# Patient Record
Sex: MALE | Race: WHITE | URBAN - METROPOLITAN AREA
[De-identification: names, ages, dates, MRNs, and addresses within clinical notes are randomized per-mention and may not be internally consistent; named-entity substitution may affect disease eponyms.]

---

## 2018-11-09 ENCOUNTER — HOSPITAL ENCOUNTER (OUTPATIENT)
Dept: NUTRITION | Facility: HOSPITAL | Age: 38
End: 2018-11-10
Attending: INTERNAL MEDICINE | Admitting: INTERNAL MEDICINE

## 2018-11-09 LAB
AMPHET UR QL SCN: NORMAL
APPEARANCE, UA: CLEAR
BACTERIA SPEC CULT: NORMAL /HPF
BARBITURATE SCN PRESENT UR: NORMAL
BENZODIAZ UR QL SCN: NORMAL
BILIRUB UR QL STRIP: NEGATIVE
CANNABINOIDS UR QL SCN: NORMAL
COCAINE UR QL SCN: NORMAL
COLOR UR: YELLOW
GLUCOSE (UA): NEGATIVE
HGB UR QL STRIP: NEGATIVE
KETONES UR QL STRIP: NEGATIVE
LEUKOCYTE ESTERASE UR QL STRIP: NEGATIVE
MAGNESIUM SERPL-MCNC: 2.3 MG/DL (ref 1.8–2.4)
NITRITE UR QL STRIP: NEGATIVE
OPIATES UR QL SCN: NORMAL
PCP UR QL: NORMAL
PH UR STRIP.AUTO: 5 [PH] (ref 5–7.5)
PH UR STRIP: 5 [PH] (ref 5–9)
PROT UR QL STRIP: NEGATIVE
RBC #/AREA URNS HPF: NORMAL /[HPF]
SP GR FLD REFRACTOMETRY: 1.02 (ref 1–1.03)
SP GR UR STRIP: 1.02 (ref 1–1.03)
SQUAMOUS EPITHELIAL, UA: NORMAL
T3FREE SERPL-MCNC: 2.77 PG/ML (ref 2.18–3.98)
T4 FREE SERPL-MCNC: 1.12 NG/DL (ref 0.76–1.46)
TROPONIN I SERPL-MCNC: <0.02 NG/ML (ref 0.02–0.49)
UROBILINOGEN UR STRIP-ACNC: 0.2
WBC #/AREA URNS HPF: NORMAL /HPF

## 2022-04-30 NOTE — H&P
Patient:   Parminder Hansen             MRN: [id number]            FIN: [id number]               Age:   38 years     Sex:  Male     :  1980   Associated Diagnoses:   None   Author:   David Clark NP      see progress from Dr feliz as H/P

## 2022-04-30 NOTE — ED PROVIDER NOTES
"   Patient:   Parminder Hansen             MRN: [id number]            FIN: [id number]               Age:   38 years     Sex:  Male     :  1980   Associated Diagnoses:   Tobacco use; Atrial fibrillation with rapid ventricular response   Author:   Jose Raul EPPERSON, Rc LOCKHART      Basic Information   Time seen: Date & time 2018 10:35:00.   History source: Patient, EMS, physician.   Arrival mode: Ambulance-BLS.   History limitation: None.   Additional information: Patient's physician(s): transfer from [facility] A fib and [facility], Chief Complaint from Nursing Triage Note : Chief Complaint   2018 4:51 CST       Chief Complaint           Patient states, he was awaken because his heart was fluttering pta.  .      History of Present Illness   The patient presents with "heart racing", palpitations, Patient was transferred from the [facility].  Apparently woke up at 4:00 palpitations or irregular heart rate.  Was seen at the [facility].  Was treated with Cardizem ×2 doses IV as well as oral same for metoprolol with no change in rhythm.  No slowing of the heart rate consultation was obtained with cardiology.  Patient was started on amiodarone drip which I was told did bring his heart rate down there.  He was given Lovenox is transferred here for continuation and higher level of care and   Patient woke up with the palpitations.  No associated pain noted in the chest no nausea no vomiting no headache no syncope.  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  He does not take any form of amphetamines in any manner.  He does not use street drugs.  He did not take any cough or cold medications over-the-counter.  Normal amount of coffee.  Normal cigarette use nothing new or different  No chest pain associated with this then or now..  The onset was 6  hours ago.  The course/duration of symptoms is constant.  Character of symptoms " irregular fast.  The degree at onset was severe.  The degree at present is severe.  The exacerbating factor is caffeine use but not exertion, emotional stress, coughing, decongestant use or drug use.  The relieving factor is none.  Risk factors consist of smoking, not hypertension, not diabetes mellitus, not coronary artery disease, not atrial fibrillation, not family history, not hyperlipidemia, not drug abuse, not pacemaker and no automatic implantable cardioverter-defibrillator.  Prior episodes: none.  Therapy today: oxygen, emergency medical services and Seen and treated in the outside emergency department failed calcium channel blocker failed beta blocker currently on amiodarone with rate down 118 to 130 has received Lovenox.  Associated symptoms: denies chest pain, denies dizziness, denies near syncope, denies syncope, denies dyspnea, denies shortness of breath, denies nausea, denies vomiting and denies headache.        Review of Systems   Constitutional symptoms:  Negative except as documented in HPI.   Skin symptoms:  Negative except as documented in HPI.   Eye symptoms:  Negative except as documented in HPI.   ENMT symptoms:  Negative except as documented in HPI.   Respiratory symptoms:  Negative except as documented in HPI.   Cardiovascular symptoms:  Palpitations, tachycardia.    Gastrointestinal symptoms:  Negative except as documented in HPI.   Genitourinary symptoms:  Negative except as documented in HPI.   Musculoskeletal symptoms:  Negative except as documented in HPI.   Neurologic symptoms:  Negative except as documented in HPI.   Psychiatric symptoms:  Negative except as documented in HPI.   Endocrine symptoms:  Negative except as documented in HPI.   Hematologic/Lymphatic symptoms:  Negative except as documented in HPI.   Allergy/immunologic symptoms:  Negative except as documented in HPI.             Additional review of systems information: All other systems reviewed and otherwise negative.       Health Status   Allergies:    Allergic Reactions (Selected)  Severity Not Documented  Codeine- No reactions were documented.  HYDROcodone- No reactions were documented.,    Allergies (2) Active Reaction  codeine None Documented  HYDROcodone None Documented.   Medications:  (Selected)   Inpatient Medications  Ordered  Cardizem  mg/24 hours oral CAPsule, extended release: 120 mg, form: Cap-CD, Oral, Daily, first dose 18 5:51:00 CST, STAT  Cordarone 150mg/[id number] Bolus  m mL, 100 mL, IV, 10 mL/min, order duration: 1 dose(s), start date 18 9:01:00 CST, stop date 11/10/18 9:00:00 CST, Bolus Dose.  Admin. Over 10 min.  Normal Saline (0.9% NS) IV 1,000 mL: 1,000 mL, 1,000 mL, IV, 500 mL/hr, start date 18 6:57:00 CST  amiodarone 360 mg/D5W 200 mL PREMIX 360 mg +: 200 mL, 250 mL, IV, 33.3 mL/hr, start date 18 9:01:00 CST  Prescriptions  Prescribed  meloxicam 15 mg oral tablet: 15 mg = 1 tab(s), Oral, Daily, # 30 tab(s), 0 Refill(s)  naproxen 500 mg oral tablet: 500 mg = 1 tab(s), Oral, BID, PRN for pain, with food, # 20 tab(s), 0 Refill(s).   Immunizations: Tetanus up to date, no flu no pneumonia.      Past Medical/ Family/ Social History   Medical history: Patient denies any chronic medical problems denies any daily medication use.   Surgical history: Orthopedic procedures on bilateral knees.  Slipped femoral head  right side status post pinning right hip.  And he's had an appendectomy.  Left knee patella surgery.   Family history: Father  suicide mother alive with thyroid issues.   Social history: Alcohol use: Denies, Tobacco use: Regularly, Drug use: Denies, Occupation: Employed, As well as a student, Family/social situation: , intact family.   Problem list:    Active Problems (2)  Obesity   Tobacco user .      Physical Examination               Vital Signs   Vital Signs   2018 10:32 CST      Temperature Oral          37.1 DegC                              Temperature Oral (calculated)             98.78 DegF                             Peripheral Pulse Rate     115 bpm  HI                             Respiratory Rate          16 br/min                             SpO2                      99 %                             Oxygen Therapy            Room air                             Systolic Blood Pressure   110 mmHg                             Diastolic Blood Pressure  76 mmHg  .      No qualifying data available.   Oxygen saturation.   General:  Alert, no acute distress, Large burly male awake oriented not toxic, not anxious, not ill-appearing.    Skin:  Warm, dry, no rash, normal for ethnicity.    Head:  Normocephalic, atraumatic.    Neck:  Supple, trachea midline, no tenderness, no JVD, no carotid bruit.    Eye:  Pupils are equal, round and reactive to light, extraocular movements are intact.    Ears, nose, mouth and throat:  Tympanic membranes clear, oral mucosa moist, no pharyngeal erythema or exudate.    Cardiovascular:  Irregularly irregular rhythm, Tachycardia, Do not appreciate murmur gallop or rub irregular irregular tachycardic 115 to 130 range currently on monitor.    Respiratory:  Lungs are clear to auscultation, respirations are non-labored, breath sounds are equal.    Chest wall:  No tenderness, No deformity.    Back:  Nontender, Normal range of motion, Normal alignment, no step-offs.    Musculoskeletal:  Normal ROM, normal strength, no tenderness, no swelling, no deformity.    Gastrointestinal:  Soft, Nontender, Non distended, Normal bowel sounds, No organomegaly.    Genitourinary:  no CVA tenderness.   Neurological:  Alert and oriented to person, place, time, and situation, No focal neurological deficit observed, CN II-XII intact, normal sensory observed, normal motor observed, normal speech observed, normal coordination observed.    Lymphatics:  No lymphadenopathy.   Psychiatric:  Cooperative, appropriate mood & affect, normal judgment,  non-suicidal.       Medical Decision Making   Differential Diagnosis::  Tachycardia, atrial fibrillation, atrial flutter.    Documents reviewed:  Emergency department nurses' notes.   Electrocardiogram:  Time 11/9/2018 10:44:00, rate 126, EP Interp, Atrial fibrillation with rapid ventricular response.    Results review:  Lab results : [facility]   11/9/2018 5:25 CST       Sodium Lvl                136 mmol/L                             Potassium Lvl             4.72 mmol/L                             Chloride                  101 mmol/L                             CO2                       25.2 mmol/L                             Calcium Lvl               8.8 mg/dL                             Glucose Lvl               143 mg/dL  HI                             BUN                       16.7 mg/dL                             Creatinine                0.96 mg/dL                             eGFR-AA                   >60 mL/min/1.73 m2  NA                             eGFR-HELEN                  >60 mL/min/1.73 m2  NA                             Bili Total                0.7 mg/dL                             Bili Direct               0.16 mg/dL                             AST                       72 unit/L  HI                             ALT                       88 unit/L  HI                             Alk Phos                  62 unit/L                             Total Protein             7.4 gm/dL                             Albumin Lvl               3.60 gm/dL                             Globulin                  3.80 gm/dL  HI                             A/G Ratio                 0.9 ratio  LOW                             Total CK                  300 unit/L                             CK MB                     1.5 ng/mL                             TSH                       1.250 mIU/mL                             PT                        9.2 second(s)  LOW                             INR                       0.87   LOW                             PTT                       26.1 second(s)                             D-Dimer                   0.49 mg/L                             WBC                       10.9 x10(3)/mcL                             RBC                       5.67 x10(6)/mcL                             Hgb                       18.0 gm/dL                             Hct                       52.4 %  HI                             Platelet                  273 x10(3)/mcL                             MCV                       92.4 fL                             MCH                       31.7 pg  HI                             MCHC                      34.4 gm/dL                             RDW                       12.5 %                             MPV                       10.1 fL                             Abs Neut                  6.08 x10(3)/mcL                             Neutro Auto               56 %  NA                             Lymph Auto                35 %  NA                             Mono Auto                 6 %  NA                             Eos Auto                  2 %  NA                             Abs Eos                   0.2 x10(3)/mcL                             Basophil Auto             0 %  NA                             Abs Neutro                6.08 x10(3)/mcL                             Abs Lymph                 3.8 x10(3)/mcL                             Abs Mono                  0.7 x10(3)/mcL                             Abs Baso                  0.0 x10(3)/mcL                             IG%                       0.200 %                             IG#                       0.0200 %  HI    11/9/2018 5:12 CST       POC Troponin              0.00 ng/mL  .   Chest X-Ray:  * Final Report *    Reason For Exam  Chest Pain    Radiology Report  Clinical History  Chest Pain     Technique  2 views of the chest.     Comparison  No prior imaging available for comparison.     Findings  Lungs  are clear with no visualized focal airspace opacity.  The trachea appears midline.  The cardiomediastinal silhouette is within normal limits.  There is no evidence of pneumothorax or pleural effusion.  Visualized abdomen, soft tissues, and osseous structures are  unremarkable.     Impression  No acute cardiopulmonary process.       Signature Line  Electronically Signed By: Jose Gonzales MD  Date/Time Signed: 11/09/2018 08:29      .   Radiology results:      Reexamination/ Reevaluation   Time: 11/9/2018 11:00:00 .   Assessment: Patient currently on amiodarone that is continued patient is received Lovenox.  I explained to the patient mechanism clot blood clot formation and the reason for Lovenox.  I explained to the patient would be consulting  cardiovascular [place]..      Impression and Plan   Diagnosis   Tobacco use ([id number] Z72.0)   Atrial fibrillation with rapid ventricular response ([id number] I48.91)      Calls-Consults   -  11/9/2018 11:01:00 , Zack EPPERSON, Mindy Paz FNP, SUZETTE Crawley NP, Mr Guillen asked me to admit the patient continue present care.    Plan   Condition: Unchanged.    Disposition: Admit time  11/9/2018 11:06:00, Place in Observation Telemetry Unit.    Counseled: Patient, Regarding diagnosis, Regarding diagnostic results, Regarding treatment plan, Patient indicated understanding of instructions.